# Patient Record
Sex: FEMALE | Race: WHITE | NOT HISPANIC OR LATINO | Employment: FULL TIME | URBAN - METROPOLITAN AREA
[De-identification: names, ages, dates, MRNs, and addresses within clinical notes are randomized per-mention and may not be internally consistent; named-entity substitution may affect disease eponyms.]

---

## 2017-05-17 DIAGNOSIS — F32.A DEPRESSION, UNSPECIFIED DEPRESSION TYPE: Primary | ICD-10-CM

## 2017-05-17 RX ORDER — SERTRALINE HYDROCHLORIDE 50 MG/1
50 TABLET, FILM COATED ORAL DAILY
Qty: 90 TABLET | Refills: 0 | Status: SHIPPED | OUTPATIENT
Start: 2017-05-17 | End: 2018-05-17

## 2017-08-07 ENCOUNTER — OFFICE VISIT (OUTPATIENT)
Dept: OBSTETRICS AND GYNECOLOGY | Facility: CLINIC | Age: 62
End: 2017-08-07
Payer: COMMERCIAL

## 2017-08-07 ENCOUNTER — PATIENT MESSAGE (OUTPATIENT)
Dept: OBSTETRICS AND GYNECOLOGY | Facility: CLINIC | Age: 62
End: 2017-08-07

## 2017-08-07 VITALS
SYSTOLIC BLOOD PRESSURE: 128 MMHG | BODY MASS INDEX: 37.02 KG/M2 | HEIGHT: 67 IN | DIASTOLIC BLOOD PRESSURE: 82 MMHG | WEIGHT: 235.88 LBS

## 2017-08-07 DIAGNOSIS — Z78.0 POST-MENOPAUSE: ICD-10-CM

## 2017-08-07 DIAGNOSIS — Z01.419 ENCOUNTER FOR GYNECOLOGICAL EXAMINATION: ICD-10-CM

## 2017-08-07 DIAGNOSIS — Z12.39 BREAST CANCER SCREENING: Primary | ICD-10-CM

## 2017-08-07 PROCEDURE — 99396 PREV VISIT EST AGE 40-64: CPT | Mod: S$GLB,,, | Performed by: OBSTETRICS & GYNECOLOGY

## 2017-08-07 PROCEDURE — 99999 PR PBB SHADOW E&M-EST. PATIENT-LVL III: CPT | Mod: PBBFAC,,, | Performed by: OBSTETRICS & GYNECOLOGY

## 2017-08-07 RX ORDER — SPIRONOLACTONE 50 MG/1
TABLET, FILM COATED ORAL
COMMUNITY
Start: 2017-07-25 | End: 2019-10-03

## 2017-08-07 RX ORDER — SERTRALINE HYDROCHLORIDE 100 MG/1
TABLET, FILM COATED ORAL
Refills: 3 | COMMUNITY
Start: 2017-06-16

## 2017-08-07 RX ORDER — EZETIMIBE 10 MG/1
10 TABLET ORAL DAILY
Refills: 1 | COMMUNITY
Start: 2017-06-20 | End: 2019-10-03

## 2017-08-07 RX ORDER — ESTRADIOL 1 MG/1
1 TABLET ORAL DAILY
Qty: 90 TABLET | Refills: 3 | Status: SHIPPED | OUTPATIENT
Start: 2017-08-07 | End: 2017-08-21 | Stop reason: SDUPTHER

## 2017-08-07 NOTE — PROGRESS NOTES
CC: Well woman exam    Elsa Juárez is a 62 y.o. female  presents for a well woman exam.  She is established.  LMP: No LMP recorded. Patient has had a hysterectomy..   Annual Exam, since insurance has changed and is not covering Premarin or Detrol.  She will see what is covered for overactive bladder.  The meantime we'll switch to estradiol 0.5 mg patient wants to try to wean off so will take 1 every other day for 1 month and then twice a week for 1 month and then stop.  She will let me know if she has any problems with this.    Last mammo  normal @ [facility],    Last Pap May 2013 negative--vaginal Pap  Last bone density 2014 osteopenia  Last colonoscopy  normal      Health Maintenance   Topic Date Due    Hepatitis C Screening  1955    TETANUS VACCINE  1971    Colonoscopy  2003    Zoster Vaccine  2013    Influenza Vaccine  2017    Mammogram  2017    Lipid Panel  2021         Past Medical History:   Diagnosis Date    Basal cell carcinoma     on forehead    Diverticulosis     Herpes simplex virus (HSV) infection     History of colonoscopy     Normal    Lupus (systemic lupus erythematosus)     Osteopenia        Past Surgical History:   Procedure Laterality Date    BREAST BIOPSY      Left    CHOLECYSTECTOMY      [facility]    HYSTERECTOMY  2002    MONET & BSO    tonsilectomy      TUBAL LIGATION         OB History    Para Term  AB Living   2 2 2     2   SAB TAB Ectopic Multiple Live Births           2      # Outcome Date GA Lbr Lj/2nd Weight Sex Delivery Anes PTL Lv   2 Term            1 Term                   Family History   Problem Relation Age of Onset    Prostate cancer Father     Heart disease Father     Alzheimer's disease Mother     Prostate cancer Brother     Breast cancer Neg Hx     Colon cancer Neg Hx     Diabetes Neg Hx        Social History   Substance Use Topics    Smoking status: Never Smoker     "Smokeless tobacco: Never Used    Alcohol use No       /82   Ht 5' 7" (1.702 m)   Wt 107 kg (235 lb 14.3 oz)   BMI 36.95 kg/m²       ROS:  GENERAL: Denies weight gain or weight loss. Feeling well overall.   SKIN: Denies rash or lesions.   HEAD: Denies head injury or headache.   NODES: Denies enlarged lymph nodes.   CHEST: Denies chest pain or shortness of breath.   CARDIOVASCULAR: Denies palpitations or left sided chest pain.   ABDOMEN: No abdominal pain, constipation, diarrhea, nausea, vomiting or rectal bleeding.   URINARY: No frequency, dysuria, hematuria, or burning on urination.  REPRODUCTIVE: See HPI.   BREASTS: The patient performs breast self-examination and denies pain, lumps, or nipple discharge.   HEMATOLOGIC: No easy bruisability or excessive bleeding.  MUSCULOSKELETAL: Denies joint pain or swelling.   NEUROLOGIC: Denies syncope or weakness.   PSYCHIATRIC: Denies depression, anxiety or mood swings.    Physical Exam:    APPEARANCE: Well nourished, well developed, in no acute distress.  AFFECT: WNL, alert and oriented x 3  SKIN: No acne or hirsutism  ABDOMEN: Soft.  No tenderness or masses.  No hepatosplenomegaly.  No hernias.  BREASTS: Symmetrical, no skin changes or visible lesions.  No palpable masses, nipple discharge bilaterally.  PELVIC: Normal external genitalia without lesions.  Normal hair distribution.  Adequate perineal body, normal urethral meatus.  Vagina moist and well rugated without lesions or discharge.  Cervix pink, without lesions, discharge or tenderness.  No significant cystocele or rectocele.  Bimanual exam shows uterus to be normal size, regular, mobile and nontender.  Adnexa without masses or tenderness.    EXTREMITIES: No edema.    ASSESSMENT AND PLAN  1. Breast cancer screening  Mammo Digital Screening Bilat with Tomosynthesis CAD    Mammo Digital Screening Bilat with Tomosynthesis CAD   2. Encounter for gynecological examination         Patient was counseled today on " A.C.S. Pap guidelines and recommendations for yearly pelvic exams, mammograms and monthly self breast exams; to see her PCP for other health maintenance.     Return in about 1 year (around 8/7/2018).

## 2017-08-21 DIAGNOSIS — Z78.0 POST-MENOPAUSE: ICD-10-CM

## 2017-08-21 RX ORDER — ESTRADIOL 1 MG/1
0.5 TABLET ORAL DAILY
Qty: 45 TABLET | Refills: 3 | Status: SHIPPED | OUTPATIENT
Start: 2017-08-21 | End: 2018-09-17

## 2018-09-17 ENCOUNTER — OFFICE VISIT (OUTPATIENT)
Dept: OBSTETRICS AND GYNECOLOGY | Facility: CLINIC | Age: 63
End: 2018-09-17
Payer: MEDICARE

## 2018-09-17 VITALS
SYSTOLIC BLOOD PRESSURE: 124 MMHG | HEIGHT: 67 IN | WEIGHT: 251.31 LBS | DIASTOLIC BLOOD PRESSURE: 82 MMHG | BODY MASS INDEX: 39.44 KG/M2

## 2018-09-17 DIAGNOSIS — N32.81 OAB (OVERACTIVE BLADDER): Primary | ICD-10-CM

## 2018-09-17 DIAGNOSIS — M81.0 AGE-RELATED OSTEOPOROSIS WITHOUT CURRENT PATHOLOGICAL FRACTURE: ICD-10-CM

## 2018-09-17 DIAGNOSIS — Z12.31 BREAST CANCER SCREENING BY MAMMOGRAM: ICD-10-CM

## 2018-09-17 DIAGNOSIS — M85.80 OSTEOPENIA, UNSPECIFIED LOCATION: ICD-10-CM

## 2018-09-17 DIAGNOSIS — M85.9 DISORDER OF BONE DENSITY AND STRUCTURE, UNSPECIFIED: ICD-10-CM

## 2018-09-17 PROCEDURE — 1101F PT FALLS ASSESS-DOCD LE1/YR: CPT | Mod: CPTII,,, | Performed by: OBSTETRICS & GYNECOLOGY

## 2018-09-17 PROCEDURE — 3008F BODY MASS INDEX DOCD: CPT | Mod: CPTII,,, | Performed by: OBSTETRICS & GYNECOLOGY

## 2018-09-17 PROCEDURE — 99999 PR PBB SHADOW E&M-EST. PATIENT-LVL IV: CPT | Mod: PBBFAC,,, | Performed by: OBSTETRICS & GYNECOLOGY

## 2018-09-17 PROCEDURE — 99213 OFFICE O/P EST LOW 20 MIN: CPT | Mod: S$PBB,,, | Performed by: OBSTETRICS & GYNECOLOGY

## 2018-09-17 PROCEDURE — 99214 OFFICE O/P EST MOD 30 MIN: CPT | Mod: PBBFAC,PN | Performed by: OBSTETRICS & GYNECOLOGY

## 2018-09-17 RX ORDER — OXYBUTYNIN CHLORIDE 5 MG/1
TABLET ORAL
COMMUNITY
Start: 2018-07-07 | End: 2018-09-17 | Stop reason: SDUPTHER

## 2018-09-17 RX ORDER — SERTRALINE HYDROCHLORIDE 50 MG/1
TABLET, FILM COATED ORAL
COMMUNITY
Start: 2018-08-08

## 2018-09-17 RX ORDER — GEMFIBROZIL 600 MG/1
600 TABLET, FILM COATED ORAL
COMMUNITY
Start: 2018-08-27

## 2018-09-17 RX ORDER — OMEPRAZOLE 40 MG/1
CAPSULE, DELAYED RELEASE ORAL
COMMUNITY
Start: 2018-08-14

## 2018-09-17 RX ORDER — OXYBUTYNIN CHLORIDE 5 MG/1
5 TABLET ORAL 2 TIMES DAILY
Qty: 60 TABLET | Refills: 30 | Status: SHIPPED | OUTPATIENT
Start: 2018-09-17 | End: 2019-10-03

## 2018-09-17 RX ORDER — FLUTICASONE PROPIONATE 50 MCG
2 SPRAY, SUSPENSION (ML) NASAL DAILY
Refills: 6 | COMMUNITY
Start: 2018-09-07 | End: 2020-10-12

## 2018-09-17 NOTE — PROGRESS NOTES
[facility] Complaint  OAB  , last mammo  birads 2 at [facility](wants to continue going to [facility]),  Last DEXA  osteopenia, Wants to discuss Oxybutin 5mg, doesnt work as well as the Detrol did but Detrol was too expensive.)      Elsa Juárez is a 63 y.o. female  presents for OAB followup    She is established.  Has OAB on Oxybutinin 5mg daily and not working as well as Detrol. She will find out what is covered with insurance and let us know  Had elevated LFT and is being followed by GI Dr Trent  Cycles:None s/p hysterectomy  Last MM/17 Birads 2  Last Pap May 2013 negative--vaginal Pap  Last bone density 2014 osteopenia  Last colonoscopy  normal    Current Outpatient Medications:     atenolol (TENORMIN) 50 MG tablet, twice a day, Disp: , Rfl:     biotin 5 mg Tab, once a day, Disp: , Rfl:     ezetimibe (ZETIA) 10 mg tablet, Take 10 mg by mouth once daily., Disp: , Rfl: 1    finasteride (PROSCAR) 5 mg tablet, once a day, Disp: , Rfl:     fluticasone (FLONASE) 50 mcg/actuation nasal spray, 2 sprays by Each Nare route once daily., Disp: , Rfl: 6    gemfibrozil (LOPID) 600 MG tablet, , Disp: , Rfl:     HYDROXYCHLOROQUINE SULFATE (PLAQUENIL ORAL), once a day, Disp: , Rfl:     minoxidil 5 % Soln, , Disp: , Rfl:     multivitamin with minerals tablet, , Disp: , Rfl:     omeprazole (PRILOSEC) 40 MG capsule, , Disp: , Rfl:     oxybutynin (DITROPAN) 5 MG Tab, Take 1 tablet (5 mg total) by mouth 2 (two) times daily., Disp: 60 tablet, Rfl: 30    sertraline (ZOLOFT) 100 MG tablet, TAKE 1 TABLET (100 MG TOTAL) BY MOUTH ONCE DAILY., Disp: , Rfl: 3    sertraline (ZOLOFT) 50 MG tablet, , Disp: , Rfl:     spironolactone (ALDACTONE) 50 MG tablet, , Disp: , Rfl:     Past Medical History:   Diagnosis Date    Basal cell carcinoma     on forehead    Diverticulosis     Elevated liver enzymes 2018    Herpes simplex virus (HSV) infection     History of colonoscopy 2014    Normal    Lupus (systemic lupus  "erythematosus)     Osteopenia        Past Surgical History:   Procedure Laterality Date    BREAST BIOPSY  2008    Left    CHOLECYSTECTOMY  2015    [facility]    HYSTERECTOMY  2002    MONET & BSO    TOE SURGERY  2018    right foot, second toe    tonsilectomy      TUBAL LIGATION         OB History    Para Term  AB Living   2 2 2     2   SAB TAB Ectopic Multiple Live Births           2      # Outcome Date GA Lbr Lj/2nd Weight Sex Delivery Anes PTL Lv   2 Term            1 Term                   Family History   Problem Relation Age of Onset    Prostate cancer Father     Heart disease Father     Alzheimer's disease Mother     Prostate cancer Brother     Breast cancer Neg Hx     Colon cancer Neg Hx     Diabetes Neg Hx        Social History     Tobacco Use    Smoking status: Never Smoker    Smokeless tobacco: Never Used   Substance Use Topics    Alcohol use: No    Drug use: No         ROS:    GENERAL: Denies weight gain or weight loss. Feeling well overall.   SKIN: Denies rash or lesions.   HEENT: Denies headaches, or vision changes.   CARDIOVASCULAR: Denies palpitations or left sided chest pain.   RESPIRATORY: Denies shortness of breath or dyspnea on exertion.  BREASTS: Denies pain, lumps, or nipple discharge.   ABDOMEN: Denies abdominal pain, constipation, diarrhea, nausea, vomiting, change in appetite or rectal bleeding.   URINARY: Denies frequency, dysuria, hematuria.  NEUROLOGIC: Denies syncope or weakness.   PSYCHIATRIC: Denies depression, anxiety or mood swings.    Physical Exam:  /82   Ht 5' 7" (1.702 m)   Wt 114 kg (251 lb 5.2 oz)   BMI 39.36 kg/m²     APPEARANCE: Well nourished, well developed, in no acute distress.  AFFECT: WNL, alert and oriented x 3  SKIN: No acne or hirsutism  BREASTS: Symmetrical, no skin changes.                    No nipple discharge. No palpable masses bilaterally  ABDOMEN: soft Non tender Non distended No masses  PELVIC:   Normal external " genitalia without lesions.   Normal urethral meatus. No signif cystocele or rectocele.  Vagina atrophic without lesions or discharge.  Cuff intact  Cervix absent  Adnexa without masses or tenderness.    EXTREMITIES: No edema.    ASSESSMENT AND PLAN  1. OAB (overactive bladder)  oxybutynin (DITROPAN) 5 MG Tab   2. Osteopenia, unspecified location  DXA Bone Density Spine And Hip    DXA Bone Density Spine And Hip   3. Breast cancer screening by mammogram  Mammo Digital Screening Bilat with Tomosynthesis CAD    Mammo Digital Screening Bilat with Tomosynthesis CAD   4. Disorder of bone density and structure, unspecified   DXA Bone Density Spine And Hip    DXA Bone Density Spine And Hip   5. Age-related osteoporosis without current pathological fracture   DXA Bone Density Spine And Hip    DXA Bone Density Spine And Hip       Elsa was seen today for well woman.    Diagnoses and all orders for this visit:    OAB (overactive bladder)  -     oxybutynin (DITROPAN) 5 MG Tab; Take 1 tablet (5 mg total) by mouth 2 (two) times daily.  Will increase Ditropan to twice daily and she will find out from [employer] what is covered so that we can change prescription.    Osteopenia, unspecified location  -     DXA Bone Density Spine And Hip; Future  -     DXA Bone Density Spine And Hip   Recommend vitamin-D.  Breast cancer screening by mammogram  -     Mammo Digital Screening Bilat with Tomosynthesis CAD; Future  -     Mammo Digital Screening Bilat with Tomosynthesis CAD   [facility]  Disorder of bone density and structure, unspecified   -     DXA Bone Density Spine And Hip; Future  -     DXA Bone Density Spine And Hip   Patient to [facility] at [facility]  Age-related osteoporosis without current pathological fracture   -     DXA Bone Density Spine And Hip; Future  -     DXA Bone Density Spine And Hip        Annual well woman exam- pap not applicable s/p Hyst    Patient was counseled today on  A.C.S. Pap guidelines and recommendations for yearly pelvic exams, mammograms and monthly self breast exams; to see her PCP for other health maintenance.     Patient encouraged to register for portal and results will be sent via portal.    Follow-up in about 1 year (around 9/17/2019) for Well-woman exam.         Health Maintenance   Topic Date Due    Hepatitis C Screening  1955    TETANUS VACCINE  01/21/1971    DEXA SCAN  01/21/1993    Colonoscopy  01/21/2003    Zoster Vaccine  01/21/2013    Mammogram  08/22/2017    Pneumococcal (65+) (1 of 2 - PCV13) 01/21/2018    Influenza Vaccine  08/01/2018    Lipid Panel  02/23/2021

## 2018-09-26 NOTE — TELEPHONE ENCOUNTER
Pt. Called her insurance & asking if you think it would make a difference if she takes two 5mg Oxybutin (Ditropan) at once instead of twice daily?  Nothing else that insurance covers is affordable.

## 2018-09-27 RX ORDER — OXYBUTYNIN CHLORIDE 15 MG/1
15 TABLET, EXTENDED RELEASE ORAL DAILY
Qty: 90 TABLET | Refills: 3 | Status: SHIPPED | OUTPATIENT
Start: 2018-09-27 | End: 2019-09-27

## 2018-09-27 NOTE — TELEPHONE ENCOUNTER
Pt. States that Oxybutin ER 15mg one po qd is affordable for her.  states she can try & see if it is any different. Will send rx to [facility]

## 2018-10-01 RX ORDER — OXYBUTYNIN CHLORIDE 15 MG/1
15 TABLET, EXTENDED RELEASE ORAL DAILY
Qty: 90 TABLET | Refills: 3 | Status: SHIPPED | OUTPATIENT
Start: 2018-10-01 | End: 2019-10-01

## 2018-10-01 RX ORDER — OXYBUTYNIN CHLORIDE 15 MG/1
15 TABLET, EXTENDED RELEASE ORAL DAILY
Qty: 90 TABLET | Refills: 3 | Status: CANCELLED | OUTPATIENT
Start: 2018-10-01 | End: 2019-10-01

## 2018-10-10 ENCOUNTER — TELEPHONE (OUTPATIENT)
Dept: OBSTETRICS AND GYNECOLOGY | Facility: CLINIC | Age: 63
End: 2018-10-10

## 2018-10-10 NOTE — TELEPHONE ENCOUNTER
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

## 2019-08-26 RX ORDER — OXYBUTYNIN CHLORIDE 15 MG/1
TABLET, EXTENDED RELEASE ORAL
Qty: 90 TABLET | Refills: 3 | Status: SHIPPED | OUTPATIENT
Start: 2019-08-26 | End: 2022-03-21

## 2019-10-03 ENCOUNTER — OFFICE VISIT (OUTPATIENT)
Dept: OBSTETRICS AND GYNECOLOGY | Facility: CLINIC | Age: 64
End: 2019-10-03
Payer: MEDICARE

## 2019-10-03 VITALS
BODY MASS INDEX: 38.06 KG/M2 | DIASTOLIC BLOOD PRESSURE: 80 MMHG | HEIGHT: 67 IN | WEIGHT: 242.5 LBS | SYSTOLIC BLOOD PRESSURE: 126 MMHG

## 2019-10-03 DIAGNOSIS — Z12.31 BREAST CANCER SCREENING BY MAMMOGRAM: ICD-10-CM

## 2019-10-03 DIAGNOSIS — Z01.419 ENCOUNTER FOR GYNECOLOGICAL EXAMINATION: Primary | ICD-10-CM

## 2019-10-03 PROCEDURE — G0101 CA SCREEN;PELVIC/BREAST EXAM: HCPCS | Mod: S$GLB,,, | Performed by: OBSTETRICS & GYNECOLOGY

## 2019-10-03 PROCEDURE — G0101 PR CA SCREEN;PELVIC/BREAST EXAM: ICD-10-PCS | Mod: S$GLB,,, | Performed by: OBSTETRICS & GYNECOLOGY

## 2019-10-03 PROCEDURE — 99999 PR PBB SHADOW E&M-EST. PATIENT-LVL III: CPT | Mod: PBBFAC,,, | Performed by: OBSTETRICS & GYNECOLOGY

## 2019-10-03 PROCEDURE — 99999 PR PBB SHADOW E&M-EST. PATIENT-LVL III: ICD-10-PCS | Mod: PBBFAC,,, | Performed by: OBSTETRICS & GYNECOLOGY

## 2019-10-03 RX ORDER — DUTASTERIDE 0.5 MG/1
CAPSULE, LIQUID FILLED ORAL
COMMUNITY
Start: 2019-08-26 | End: 2023-01-25

## 2019-10-03 RX ORDER — HYDROXYCHLOROQUINE SULFATE 200 MG/1
TABLET, FILM COATED ORAL
COMMUNITY
Start: 2019-09-14

## 2019-10-03 NOTE — PROGRESS NOTES
"[facility] Complaint Well woman exam:  Well Woman (Annual Exam, Last mammo  birads 2 [facility], Last colonoscopy  normal, Last DEXA  normal [facility])      Elsa Juárez is a 64 y.o. female  presents for a well woman exam.    She is established.  No complaints. Had left breast pain a week or so ago and lasted for 3 d. Did not feel any masses  Her  had recent knee replacement surgery and she has been helping with 10 and take care of him this past month. We discussed could be musculoskelatal  Specifically, patient denies abnormal vaginal bleeding, discharge and odor", or "pelvic pain.     LMP: none  S/p MONET and BSO  Last pap: s2018 [facility] birads 2   Last colonoscopy:  Normal  Last bone density 2018 diagnostic imaging normal      Current Outpatient Medications:     atenolol (TENORMIN) 50 MG tablet, twice a day, Disp: , Rfl:     biotin 5 mg Tab, once a day, Disp: , Rfl:     dutasteride (AVODART) 0.5 mg capsule, , Disp: , Rfl:     fluticasone (FLONASE) 50 mcg/actuation nasal spray, 2 sprays by Each Nare route once daily., Disp: , Rfl: 6    gemfibrozil (LOPID) 600 MG tablet, , Disp: , Rfl:     hydroxychloroquine (PLAQUENIL) 200 mg tablet, , Disp: , Rfl:     minoxidil 5 % Soln, , Disp: , Rfl:     multivitamin with minerals tablet, , Disp: , Rfl:     omeprazole (PRILOSEC) 40 MG capsule, , Disp: , Rfl:     oxybutynin (DITROPAN XL) 15 MG TR24, TAKE 1 TABLET EVERY DAY, Disp: 90 tablet, Rfl: 3    sertraline (ZOLOFT) 100 MG tablet, TAKE 1 TABLET (100 MG TOTAL) BY MOUTH ONCE DAILY., Disp: , Rfl: 3    sertraline (ZOLOFT) 50 MG tablet, , Disp: , Rfl:     Past Medical History:   Diagnosis Date    Basal cell carcinoma     on forehead    Diverticulosis     Elevated liver enzymes 2018    Herpes simplex virus (HSV) infection     History of colonoscopy     Normal    Lupus (systemic lupus erythematosus)     Osteopenia        Past Surgical History:   Procedure Laterality Date    BREAST BIOPSY  " "2008    Left    CHOLECYSTECTOMY  2015    [facility]    HYSTERECTOMY  2002    MONET & BSO    TOE SURGERY  2018    right foot, second toe    tonsilectomy      TUBAL LIGATION         OB History    Para Term  AB Living   2 2 2     2   SAB TAB Ectopic Multiple Live Births           2      # Outcome Date GA Lbr Lj/2nd Weight Sex Delivery Anes PTL Lv   2 Term            1 Term                Family History   Problem Relation Age of Onset    Prostate cancer Father     Heart disease Father     Alzheimer's disease Mother     Prostate cancer Brother     Breast cancer Neg Hx     Colon cancer Neg Hx     Diabetes Neg Hx        Social History     Tobacco Use    Smoking status: Never Smoker    Smokeless tobacco: Never Used   Substance Use Topics    Alcohol use: No    Drug use: No         ROS:    GENERAL: Denies weight gain or weight loss. Feeling well overall.   SKIN: Denies rash or lesions.   HEENT: Denies headaches, or vision changes.   CARDIOVASCULAR: Denies palpitations or left sided chest pain.   RESPIRATORY: Denies shortness of breath or dyspnea on exertion.  BREASTS: Denies pain, lumps, or nipple discharge.   ABDOMEN: Denies abdominal pain, constipation, diarrhea, nausea, vomiting, change in appetite or rectal bleeding.   URINARY: Denies frequency, dysuria, hematuria.  NEUROLOGIC: Denies syncope or weakness.   PSYCHIATRIC: Denies depression, anxiety or mood swings.    Physical Exam:  /80   Ht 5' 7" (1.702 m)   Wt 110 kg (242 lb 8.1 oz)   BMI 37.98 kg/m²     APPEARANCE: Well nourished, well developed, in no acute distress.  AFFECT: WNL, alert and oriented x 3  SKIN: No acne or hirsutism  BREASTS: Symmetrical, no skin changes.                     No nipple discharge. No palpable masses bilaterally  NODES: No inguinal nor axillary LAD  ABDOMEN: soft Non tender Non distended No masses  PELVIC:   Normal external genitalia without lesions.   Normal urethral meatus. No signif cystocele or " rectocele.  Vagina atrophic without lesions or discharge.  Cuff intact  Cervix absent  Adnexa without masses or tenderness.    EXTREMITIES: No edema.    ASSESSMENT AND PLAN  Elsa was seen today for well woman.    Diagnoses and all orders for this visit:    Encounter for gynecological examination  Had left breast pain a week or so ago and lasted for 3 d. Did not feel any masses  Her  had recent knee replacement surgery and she has been helping with 10 and take care of him this past month. We discussed could be musculoskelatal  No pain now-- will observe and if returns she will let me know    Breast cancer screening by mammogram  -     Mammo Digital Screening Bilat w/ Ajime; Future  -     Mammo Digital Screening Bilat w/ Jaime    Rec Vit D 2000 IU daily    Patient was counseled today on A.C.S. Pap guidelines and recommendations for yearly pelvic exams, mammograms and monthly self breast exams; to see her PCP for other health maintenance.     Patient encouraged to register for portal and results will be sent via portal.    Follow up in about 1 year (around 10/3/2020).         Health Maintenance   Topic Date Due    Hepatitis C Screening  1955    TETANUS VACCINE  01/21/1971    DEXA SCAN  01/21/1993    Colonoscopy  01/21/2003    Mammogram  08/22/2017    Pneumococcal Vaccine (65+ Low/Medium Risk) (1 of 2 - PCV13) 01/21/2018    Lipid Panel  02/23/2021

## 2020-10-12 ENCOUNTER — OFFICE VISIT (OUTPATIENT)
Dept: OBSTETRICS AND GYNECOLOGY | Facility: CLINIC | Age: 65
End: 2020-10-12
Attending: OBSTETRICS & GYNECOLOGY
Payer: MEDICARE

## 2020-10-12 VITALS
WEIGHT: 244.69 LBS | SYSTOLIC BLOOD PRESSURE: 132 MMHG | DIASTOLIC BLOOD PRESSURE: 84 MMHG | BODY MASS INDEX: 38.4 KG/M2 | TEMPERATURE: 97 F | HEIGHT: 67 IN

## 2020-10-12 DIAGNOSIS — Z01.419 ENCOUNTER FOR GYNECOLOGICAL EXAMINATION: Primary | ICD-10-CM

## 2020-10-12 DIAGNOSIS — Z12.31 BREAST CANCER SCREENING BY MAMMOGRAM: ICD-10-CM

## 2020-10-12 PROCEDURE — 99999 PR PBB SHADOW E&M-EST. PATIENT-LVL IV: ICD-10-PCS | Mod: PBBFAC,,, | Performed by: OBSTETRICS & GYNECOLOGY

## 2020-10-12 PROCEDURE — G0101 PR CA SCREEN;PELVIC/BREAST EXAM: ICD-10-PCS | Mod: S$GLB,,, | Performed by: OBSTETRICS & GYNECOLOGY

## 2020-10-12 PROCEDURE — 99999 PR PBB SHADOW E&M-EST. PATIENT-LVL IV: CPT | Mod: PBBFAC,,, | Performed by: OBSTETRICS & GYNECOLOGY

## 2020-10-12 PROCEDURE — G0101 CA SCREEN;PELVIC/BREAST EXAM: HCPCS | Mod: S$GLB,,, | Performed by: OBSTETRICS & GYNECOLOGY

## 2020-10-12 NOTE — PROGRESS NOTES
LMP: No LMP recorded. Patient has had a hysterectomy.  Meds per MD: None    Last Pap: N/A  Last MMG: 10- birads 2 [facility]  Last Colonoscopy:  2014 Normal  Last Bone Density: 2018 normal [facility]

## 2020-10-12 NOTE — PROGRESS NOTES
[facility] Complaint Well woman exam:  Well Woman (Annual Exam)    She is established.     Elsa Juárez is a 65 y.o. female  presents for a well woman exam.    S/p MONET and BSO     ROS:  No abdominal pain No vaginal bleeding or discharge  No breast pain or masses, No rectal bleeding      LMP: No LMP recorded. Patient has had a hysterectomy.  Meds per MD: None   Last Pap: N/A  Last MMG: 10- birads 2 [facility]  Last Colonoscopy:   Normal  Last Bone Density:  normal [facility]       Past Medical History:   Diagnosis Date    Basal cell carcinoma     on forehead    Diverticulosis     Elevated liver enzymes     Herpes simplex virus (HSV) infection     History of colonoscopy     Normal    Lupus (systemic lupus erythematosus)     Osteopenia 2014       Past Surgical History:   Procedure Laterality Date    BREAST BIOPSY      Left    CHOLECYSTECTOMY      [facility]    HYSTERECTOMY  2002    MONET & BSO    TOE SURGERY  2018    right foot, second toe    tonsilectomy      TUBAL LIGATION         OB History    Para Term  AB Living   2 2 2     2   SAB TAB Ectopic Multiple Live Births           2      # Outcome Date GA Lbr Lj/2nd Weight Sex Delivery Anes PTL Lv   2 Term            1 Term                Family History   Problem Relation Age of Onset    Prostate cancer Father     Heart disease Father     Alzheimer's disease Mother     Prostate cancer Brother     Breast cancer Neg Hx     Colon cancer Neg Hx     Diabetes Neg Hx        Social History     Tobacco Use    Smoking status: Never Smoker    Smokeless tobacco: Never Used   Substance Use Topics    Alcohol use: No    Drug use: No         ROS:    GENERAL: Denies weight gain or weight loss. Feeling well overall.   SKIN: Denies rash or lesions.   HEENT: Denies headaches, or vision changes.   CARDIOVASCULAR: Denies palpitations or left sided chest pain.   RESPIRATORY: Denies shortness of breath or dyspnea on exertion.  BREASTS:  "Denies pain, lumps, or nipple discharge.   ABDOMEN: Denies abdominal pain, constipation, diarrhea, nausea, vomiting, change in appetite or rectal bleeding.   URINARY: Denies frequency, dysuria, hematuria.  NEUROLOGIC: Denies syncope or weakness.   PSYCHIATRIC: Denies depression, anxiety or mood swings.    Physical Exam:  /84   Temp 97.3 °F (36.3 °C)   Ht 5' 7" (1.702 m)   Wt 111 kg (244 lb 11.4 oz)   BMI 38.33 kg/m²     APPEARANCE: Well nourished, well developed, in no acute distress.  AFFECT: WNL, alert and oriented x 3  SKIN: No acne or hirsutism  BREASTS: Symmetrical, no skin changes.                      No nipple discharge.   No palpable masses bilaterally  NODES: No inguinal nor axillary LAD  ABDOMEN: soft Non tender Non distended No masses  PELVIC:   Normal external genitalia without lesions.   Normal urethral meatus.   No signif cystocele or rectocele.  Vagina atrophic without lesions or discharge.  Cuff intact  Cervix absent  Adnexa without masses or tenderness.    EXTREMITIES: No edema.    ASSESSMENT AND PLAN  Elsa was seen today for well woman.    Diagnoses and all orders for this visit:    Encounter for gynecological examination    Breast cancer screening by mammogram  -     Mammo Digital Screening Bilat w/ Jaime; Future  -     Mammo Digital Screening Bilat w/ Jaime          Patient was counseled today on A.C.S. Pap guidelines and recommendations for yearly pelvic exams, mammograms and monthly self breast exams; to see her PCP for other health maintenance.     Patient encouraged to register for portal and results will be sent via portal.    No follow-ups on file.         Health Maintenance   Topic Date Due    Hepatitis C Screening  1955    TETANUS VACCINE  01/21/1971    DEXA SCAN  01/21/1993    Pneumococcal Vaccine (65+ Low/Medium Risk) (1 of 2 - PCV13) 01/21/2018    Mammogram  10/22/2020    Lipid Panel  02/23/2021                   "

## 2022-01-07 ENCOUNTER — OFFICE VISIT (OUTPATIENT)
Dept: OBSTETRICS AND GYNECOLOGY | Facility: CLINIC | Age: 67
End: 2022-01-07
Attending: OBSTETRICS & GYNECOLOGY
Payer: MEDICARE

## 2022-01-07 VITALS
DIASTOLIC BLOOD PRESSURE: 84 MMHG | BODY MASS INDEX: 37.37 KG/M2 | WEIGHT: 238.13 LBS | SYSTOLIC BLOOD PRESSURE: 138 MMHG | HEIGHT: 67 IN

## 2022-01-07 DIAGNOSIS — N32.81 OAB (OVERACTIVE BLADDER): ICD-10-CM

## 2022-01-07 DIAGNOSIS — Z13.820 SCREENING FOR OSTEOPOROSIS: ICD-10-CM

## 2022-01-07 DIAGNOSIS — Z01.419 ENCOUNTER FOR GYNECOLOGICAL EXAMINATION: Primary | ICD-10-CM

## 2022-01-07 DIAGNOSIS — Z12.31 BREAST CANCER SCREENING BY MAMMOGRAM: ICD-10-CM

## 2022-01-07 DIAGNOSIS — M81.0 AGE-RELATED OSTEOPOROSIS WITHOUT CURRENT PATHOLOGICAL FRACTURE: ICD-10-CM

## 2022-01-07 PROCEDURE — 99999 PR PBB SHADOW E&M-EST. PATIENT-LVL III: CPT | Mod: PBBFAC,,, | Performed by: OBSTETRICS & GYNECOLOGY

## 2022-01-07 PROCEDURE — 3008F PR BODY MASS INDEX (BMI) DOCUMENTED: ICD-10-PCS | Mod: CPTII,S$GLB,, | Performed by: OBSTETRICS & GYNECOLOGY

## 2022-01-07 PROCEDURE — 3288F PR FALLS RISK ASSESSMENT DOCUMENTED: ICD-10-PCS | Mod: CPTII,S$GLB,, | Performed by: OBSTETRICS & GYNECOLOGY

## 2022-01-07 PROCEDURE — 3288F FALL RISK ASSESSMENT DOCD: CPT | Mod: CPTII,S$GLB,, | Performed by: OBSTETRICS & GYNECOLOGY

## 2022-01-07 PROCEDURE — 1101F PR PT FALLS ASSESS DOC 0-1 FALLS W/OUT INJ PAST YR: ICD-10-PCS | Mod: CPTII,S$GLB,, | Performed by: OBSTETRICS & GYNECOLOGY

## 2022-01-07 PROCEDURE — 1159F MED LIST DOCD IN RCRD: CPT | Mod: CPTII,S$GLB,, | Performed by: OBSTETRICS & GYNECOLOGY

## 2022-01-07 PROCEDURE — 1101F PT FALLS ASSESS-DOCD LE1/YR: CPT | Mod: CPTII,S$GLB,, | Performed by: OBSTETRICS & GYNECOLOGY

## 2022-01-07 PROCEDURE — G0101 CA SCREEN;PELVIC/BREAST EXAM: HCPCS | Mod: S$GLB,,, | Performed by: OBSTETRICS & GYNECOLOGY

## 2022-01-07 PROCEDURE — 3079F PR MOST RECENT DIASTOLIC BLOOD PRESSURE 80-89 MM HG: ICD-10-PCS | Mod: CPTII,S$GLB,, | Performed by: OBSTETRICS & GYNECOLOGY

## 2022-01-07 PROCEDURE — 1159F PR MEDICATION LIST DOCUMENTED IN MEDICAL RECORD: ICD-10-PCS | Mod: CPTII,S$GLB,, | Performed by: OBSTETRICS & GYNECOLOGY

## 2022-01-07 PROCEDURE — G0101 PR CA SCREEN;PELVIC/BREAST EXAM: ICD-10-PCS | Mod: S$GLB,,, | Performed by: OBSTETRICS & GYNECOLOGY

## 2022-01-07 PROCEDURE — 3079F DIAST BP 80-89 MM HG: CPT | Mod: CPTII,S$GLB,, | Performed by: OBSTETRICS & GYNECOLOGY

## 2022-01-07 PROCEDURE — 3075F SYST BP GE 130 - 139MM HG: CPT | Mod: CPTII,S$GLB,, | Performed by: OBSTETRICS & GYNECOLOGY

## 2022-01-07 PROCEDURE — 1126F PR PAIN SEVERITY QUANTIFIED, NO PAIN PRESENT: ICD-10-PCS | Mod: CPTII,S$GLB,, | Performed by: OBSTETRICS & GYNECOLOGY

## 2022-01-07 PROCEDURE — 3008F BODY MASS INDEX DOCD: CPT | Mod: CPTII,S$GLB,, | Performed by: OBSTETRICS & GYNECOLOGY

## 2022-01-07 PROCEDURE — 99999 PR PBB SHADOW E&M-EST. PATIENT-LVL III: ICD-10-PCS | Mod: PBBFAC,,, | Performed by: OBSTETRICS & GYNECOLOGY

## 2022-01-07 PROCEDURE — 3075F PR MOST RECENT SYSTOLIC BLOOD PRESS GE 130-139MM HG: ICD-10-PCS | Mod: CPTII,S$GLB,, | Performed by: OBSTETRICS & GYNECOLOGY

## 2022-01-07 PROCEDURE — 1126F AMNT PAIN NOTED NONE PRSNT: CPT | Mod: CPTII,S$GLB,, | Performed by: OBSTETRICS & GYNECOLOGY

## 2022-01-07 RX ORDER — MIRABEGRON 25 MG/1
25 TABLET, FILM COATED, EXTENDED RELEASE ORAL DAILY
Qty: 30 TABLET | Refills: 11 | Status: SHIPPED | OUTPATIENT
Start: 2022-01-07 | End: 2022-03-21

## 2022-01-07 NOTE — PROGRESS NOTES
LMP: No LMP recorded. Patient has had a hysterectomy.  Meds per MD: OAB meds, Zoloft     Last Pap: N/A  Last MM2020 birads 2 [facility]  Last Colonoscopy: due   Last Bone Density:  normal [facility]

## 2022-01-07 NOTE — PROGRESS NOTES
Chief Complaint Well woman exam:  Annual Exam    She is established.     Elsa Juárez is a 66 y.o. female  presents for a well woman exam.    S/p MONET and BSO  On Oxybutyrin but not working as well and wants to change meds and try myrbetric    ROS:  No abdominal pain No vaginal bleeding or discharge  No breast pain or masses, No rectal bleeding      LMP: No LMP recorded. Patient has had a hysterectomy.  Meds per MD: OAB meds, Zoloft      Last Pap: N/A  Last MM2020 birads 2 DIS  Last Colonoscopy: due   Last Bone Density:  normal DIS    Past Medical History:   Diagnosis Date    Basal cell carcinoma     on forehead    Diverticulosis     Elevated liver enzymes     Herpes simplex virus (HSV) infection     History of colonoscopy     Normal    Lupus (systemic lupus erythematosus)     Osteopenia        Past Surgical History:   Procedure Laterality Date    BREAST BIOPSY      Left    CHOLECYSTECTOMY  2015    [facility]    HYSTERECTOMY  2002    MONET & BSO    TOE SURGERY  2018    right foot, second toe    tonsilectomy      TUBAL LIGATION         OB History    Para Term  AB Living   2 2 2     2   SAB IAB Ectopic Multiple Live Births           2      # Outcome Date GA Lbr Lj/2nd Weight Sex Delivery Anes PTL Lv   2 Term            1 Term                Family History   Problem Relation Age of Onset    Prostate cancer Father     Heart disease Father     Alzheimer's disease Mother     Prostate cancer Brother     Breast cancer Neg Hx     Colon cancer Neg Hx     Diabetes Neg Hx        Social History     Tobacco Use    Smoking status: Never Smoker    Smokeless tobacco: Never Used   Substance Use Topics    Alcohol use: No    Drug use: No         ROS:    GENERAL: Denies weight gain or weight loss. Feeling well overall.   SKIN: Denies rash or lesions.   HEENT: Denies headaches, or vision changes.   CARDIOVASCULAR: Denies palpitations or chest pain.   RESPIRATORY:  "Denies shortness of breath   BREASTS: Denies pain, lumps, or nipple discharge.   ABDOMEN: Denies abdominal pain, constipation, diarrhea, nausea, vomiting, change in appetite or rectal bleeding.   URINARY: Denies frequency, dysuria, hematuria.    Physical Exam:  /84   Ht 5' 7" (1.702 m)   Wt 108 kg (238 lb 1.6 oz)   BMI 37.29 kg/m²     APPEARANCE: Well nourished, well developed, in no acute distress.  AFFECT: WNL, alert and oriented x 3  SKIN: No acne or hirsutism  BREASTS: Symmetrical, no skin changes.                      No nipple discharge.   No palpable masses bilaterally  NODES: No inguinal nor axillary LAD  ABDOMEN: soft Non tender Non distended No masses  PELVIC:   Normal external genitalia without lesions.   Normal urethral meatus.   No signif cystocele or rectocele.  Vagina atrophic without lesions or discharge.  Cuff intact  Cervix absent  Adnexa without masses or tenderness.    EXTREMITIES: No edema.    ASSESSMENT AND PLAN  Elsa was seen today for annual exam.    Diagnoses and all orders for this visit:    Encounter for gynecological examination    Screening for osteoporosis  -     DXA Bone Density Spine And Hip; Future  -     DXA Bone Density Spine And Hip    Breast cancer screening by mammogram  -     Mammo Digital Screening Bilat w/ Ja[facility]; Future  -     Mammo Digital Screening Bilat w/ [facility]    Age-related osteoporosis without current pathological fracture   -     DXA Bone Density Spine And Hip; Future  -     DXA Bone Density Spine And Hip    OAB (overactive bladder)  -     mirabegron (MYRBETRIQ) 25 mg Tb24 ER tablet; Take 1 tablet (25 mg total) by mouth once daily.        Patient was counseled today on A.C.S. Pap guidelines and recommendations for yearly pelvic exams, mammograms and monthly self breast exams; to see her PCP for other health maintenance.     Patient encouraged to register for portal and results will be sent via portal.    No follow-ups on file.         Health Maintenance " Epidural   Topic Date Due    Hepatitis C Screening  Never done    TETANUS VACCINE  Never done    DEXA SCAN  Never done    Mammogram  10/22/2020    Lipid Panel  02/23/2021

## 2022-01-26 NOTE — PROGRESS NOTES
Your bone density results are in!  Good news!  You do not have osteoporosis, but you do have osteopenia which is the beginning process of decreased bone mass.  The good news is that you don't need any medication to treat this.    I do recommend vitamin D 4468-8799 IUs daily and exercising 3-4 times weekly.  Any exercise that builds muscle does help to build bone.  Take care and let me know if you have any other questions,  Dr. Baengas

## 2022-01-27 ENCOUNTER — PATIENT MESSAGE (OUTPATIENT)
Dept: OBSTETRICS AND GYNECOLOGY | Facility: CLINIC | Age: 67
End: 2022-01-27
Payer: MEDICARE

## 2022-01-27 NOTE — PROGRESS NOTES
Just wanted to let you know that I got your mammogram results back and the radiologist reading is perfectly normal. Let me know if you have any questions or concerns  Hope you have a great day!  Dr Banegas

## 2022-02-10 ENCOUNTER — PATIENT MESSAGE (OUTPATIENT)
Dept: OBSTETRICS AND GYNECOLOGY | Facility: CLINIC | Age: 67
End: 2022-02-10
Payer: MEDICARE

## 2022-03-21 ENCOUNTER — PATIENT MESSAGE (OUTPATIENT)
Dept: OBSTETRICS AND GYNECOLOGY | Facility: CLINIC | Age: 67
End: 2022-03-21
Payer: MEDICARE

## 2022-03-21 RX ORDER — MIRABEGRON 50 MG/1
50 TABLET, FILM COATED, EXTENDED RELEASE ORAL DAILY
Qty: 90 TABLET | Refills: 3 | Status: SHIPPED | OUTPATIENT
Start: 2022-03-21 | End: 2023-01-25

## 2022-03-22 ENCOUNTER — PATIENT MESSAGE (OUTPATIENT)
Dept: OBSTETRICS AND GYNECOLOGY | Facility: CLINIC | Age: 67
End: 2022-03-22
Payer: MEDICARE

## 2022-08-29 ENCOUNTER — PATIENT MESSAGE (OUTPATIENT)
Dept: OBSTETRICS AND GYNECOLOGY | Facility: CLINIC | Age: 67
End: 2022-08-29
Payer: MEDICARE

## 2022-08-29 DIAGNOSIS — N32.81 OAB (OVERACTIVE BLADDER): Primary | ICD-10-CM

## 2022-08-29 RX ORDER — OXYBUTYNIN CHLORIDE 10 MG/1
10 TABLET, EXTENDED RELEASE ORAL DAILY
Qty: 90 TABLET | Refills: 3 | Status: SHIPPED | OUTPATIENT
Start: 2022-08-29 | End: 2023-06-17 | Stop reason: SDUPTHER

## 2023-01-25 ENCOUNTER — OFFICE VISIT (OUTPATIENT)
Dept: OBSTETRICS AND GYNECOLOGY | Facility: CLINIC | Age: 68
End: 2023-01-25
Attending: OBSTETRICS & GYNECOLOGY
Payer: MEDICARE

## 2023-01-25 VITALS — HEIGHT: 67 IN | WEIGHT: 240.31 LBS | BODY MASS INDEX: 37.72 KG/M2

## 2023-01-25 DIAGNOSIS — Z01.419 ENCOUNTER FOR GYNECOLOGICAL EXAMINATION: Primary | ICD-10-CM

## 2023-01-25 DIAGNOSIS — Z12.31 SCREENING MAMMOGRAM FOR BREAST CANCER: ICD-10-CM

## 2023-01-25 PROCEDURE — 1126F PR PAIN SEVERITY QUANTIFIED, NO PAIN PRESENT: ICD-10-PCS | Mod: CPTII,S$GLB,, | Performed by: OBSTETRICS & GYNECOLOGY

## 2023-01-25 PROCEDURE — G0101 CA SCREEN;PELVIC/BREAST EXAM: HCPCS | Mod: S$GLB,,, | Performed by: OBSTETRICS & GYNECOLOGY

## 2023-01-25 PROCEDURE — 99999 PR PBB SHADOW E&M-EST. PATIENT-LVL III: CPT | Mod: PBBFAC,,, | Performed by: OBSTETRICS & GYNECOLOGY

## 2023-01-25 PROCEDURE — 99999 PR PBB SHADOW E&M-EST. PATIENT-LVL III: ICD-10-PCS | Mod: PBBFAC,,, | Performed by: OBSTETRICS & GYNECOLOGY

## 2023-01-25 PROCEDURE — 3288F FALL RISK ASSESSMENT DOCD: CPT | Mod: CPTII,S$GLB,, | Performed by: OBSTETRICS & GYNECOLOGY

## 2023-01-25 PROCEDURE — 1159F MED LIST DOCD IN RCRD: CPT | Mod: CPTII,S$GLB,, | Performed by: OBSTETRICS & GYNECOLOGY

## 2023-01-25 PROCEDURE — 1100F PR PT FALLS ASSESS DOC 2+ FALLS/FALL W/INJURY/YR: ICD-10-PCS | Mod: CPTII,S$GLB,, | Performed by: OBSTETRICS & GYNECOLOGY

## 2023-01-25 PROCEDURE — 3008F BODY MASS INDEX DOCD: CPT | Mod: CPTII,S$GLB,, | Performed by: OBSTETRICS & GYNECOLOGY

## 2023-01-25 PROCEDURE — G0101 PR CA SCREEN;PELVIC/BREAST EXAM: ICD-10-PCS | Mod: S$GLB,,, | Performed by: OBSTETRICS & GYNECOLOGY

## 2023-01-25 PROCEDURE — 3288F PR FALLS RISK ASSESSMENT DOCUMENTED: ICD-10-PCS | Mod: CPTII,S$GLB,, | Performed by: OBSTETRICS & GYNECOLOGY

## 2023-01-25 PROCEDURE — 1100F PTFALLS ASSESS-DOCD GE2>/YR: CPT | Mod: CPTII,S$GLB,, | Performed by: OBSTETRICS & GYNECOLOGY

## 2023-01-25 PROCEDURE — 3008F PR BODY MASS INDEX (BMI) DOCUMENTED: ICD-10-PCS | Mod: CPTII,S$GLB,, | Performed by: OBSTETRICS & GYNECOLOGY

## 2023-01-25 PROCEDURE — 1126F AMNT PAIN NOTED NONE PRSNT: CPT | Mod: CPTII,S$GLB,, | Performed by: OBSTETRICS & GYNECOLOGY

## 2023-01-25 PROCEDURE — 1159F PR MEDICATION LIST DOCUMENTED IN MEDICAL RECORD: ICD-10-PCS | Mod: CPTII,S$GLB,, | Performed by: OBSTETRICS & GYNECOLOGY

## 2023-01-25 RX ORDER — NAFTIFINE HYDROCHLORIDE 20 MG/G
2 CREAM TOPICAL NIGHTLY PRN
Qty: 15 G | Refills: 1 | Status: SHIPPED | OUTPATIENT
Start: 2023-01-25

## 2023-01-25 RX ORDER — ROSUVASTATIN CALCIUM 10 MG/1
TABLET, COATED ORAL
COMMUNITY
Start: 2022-11-06

## 2023-01-25 NOTE — PROGRESS NOTES
LMP: No LMP recorded. Patient has had a hysterectomy..    Meds per MD: Naftin 2% & Zoloft    Last Pap: N/A  Last MM2022 birads 2 DIS  Last Colonoscopy: due   Last Bone Density: 2022 DIS

## 2023-01-25 NOTE — PROGRESS NOTES
"Chief Complaint Well woman exam:  Annual Exam    She is established.     Elsa Juárez is a 68 y.o. female  presents for a well woman exam.    S/p MONET and BSO hysterectomy  No c/o Fell in Nov - hit head - needed staples    ROS:  No abdominal pain No vaginal bleeding or discharge  No breast pain or masses, No rectal bleeding      LMP: No LMP recorded. Patient has had a hysterectomy..    Meds per MD: Naftin 2% & Zoloft     Last Pap: N/A  Last MM2022 birads 2 DIS  Last Colonoscopy: due   Last Bone Density: 2022 DIS     Past Medical History:   Diagnosis Date    Basal cell carcinoma     on forehead    Diverticulosis     Elevated liver enzymes     Herpes simplex virus (HSV) infection     History of colonoscopy     Normal    Lupus (systemic lupus erythematosus)     Osteopenia 2014       Past Surgical History:   Procedure Laterality Date    BREAST BIOPSY  2008    Left    CHOLECYSTECTOMY  2015    [facility]    HYSTERECTOMY  2002    MONET & BSO    TOE SURGERY  2018    right foot, second toe    tonsilectomy      TUBAL LIGATION         OB History    Para Term  AB Living   2 2 2     2   SAB IAB Ectopic Multiple Live Births           2      # Outcome Date GA Lbr Lj/2nd Weight Sex Delivery Anes PTL Lv   2 Term            1 Term                Family History   Problem Relation Age of Onset    Prostate cancer Father     Heart disease Father     Alzheimer's disease Mother     Prostate cancer Brother     Breast cancer Neg Hx     Colon cancer Neg Hx     Diabetes Neg Hx        Social History     Tobacco Use    Smoking status: Never    Smokeless tobacco: Never   Substance Use Topics    Alcohol use: No    Drug use: No         Physical Exam:  Ht 5' 7" (1.702 m)   Wt 109 kg (240 lb 4.8 oz)   BMI 37.64 kg/m²     APPEARANCE: Well nourished, well developed, in no acute distress.  AFFECT: WNL, alert and oriented x 3  SKIN: No acne or hirsutism  BREASTS: Symmetrical, no skin changes.                     "  No nipple discharge.   No palpable masses bilaterally  NODES: No inguinal nor axillary LAD  ABDOMEN: soft Non tender Non distended No masses  PELVIC:   Normal external genitalia without lesions.   Normal urethral meatus.   No signif cystocele or rectocele.  Vagina atrophic without lesions or discharge.  Cuff intact  Cervix absent  Adnexa without masses or tenderness.    EXTREMITIES: No edema.    ASSESSMENT AND PLAN  Elsa was seen today for annual exam.    Diagnoses and all orders for this visit:    Encounter for gynecological examination    Screening mammogram for breast cancer  -     Mammo Digital Screening Bilat w/ [facility]; Future  -     Mammo Digital Screening Bilat w/ [facility]    Other orders  -     naftifine 2 % Crea; Apply 2 cm topically nightly as needed (itching).          Patient was counseled today on A.C.S. Pap guidelines and recommendations for yearly pelvic exams, mammograms and monthly self breast exams; to see her PCP for other health maintenance.     Patient encouraged to register for portal and results will be sent via portal.    No follow-ups on file.         Health Maintenance   Topic Date Due    Hepatitis C Screening  Never done    TETANUS VACCINE  Never done    Mammogram  01/21/2023    DEXA Scan  08/05/2025    Lipid Panel  06/10/2027

## 2023-01-30 ENCOUNTER — PATIENT MESSAGE (OUTPATIENT)
Dept: OBSTETRICS AND GYNECOLOGY | Facility: CLINIC | Age: 68
End: 2023-01-30
Payer: MEDICARE

## 2023-01-31 RX ORDER — NYSTATIN 100000 [USP'U]/G
POWDER TOPICAL 4 TIMES DAILY
Qty: 30 G | Refills: 3 | Status: SHIPPED | OUTPATIENT
Start: 2023-01-31 | End: 2024-02-19

## 2023-02-10 ENCOUNTER — PATIENT MESSAGE (OUTPATIENT)
Dept: OBSTETRICS AND GYNECOLOGY | Facility: CLINIC | Age: 68
End: 2023-02-10
Payer: MEDICARE

## 2023-06-17 DIAGNOSIS — N32.81 OAB (OVERACTIVE BLADDER): ICD-10-CM

## 2023-06-17 RX ORDER — OXYBUTYNIN CHLORIDE 10 MG/1
TABLET, EXTENDED RELEASE ORAL
Qty: 90 TABLET | Refills: 3 | Status: SHIPPED | OUTPATIENT
Start: 2023-06-17

## 2024-02-19 ENCOUNTER — OFFICE VISIT (OUTPATIENT)
Dept: OBSTETRICS AND GYNECOLOGY | Facility: CLINIC | Age: 69
End: 2024-02-19
Attending: OBSTETRICS & GYNECOLOGY
Payer: MEDICARE

## 2024-02-19 VITALS
DIASTOLIC BLOOD PRESSURE: 84 MMHG | HEIGHT: 67 IN | SYSTOLIC BLOOD PRESSURE: 128 MMHG | BODY MASS INDEX: 34.6 KG/M2 | WEIGHT: 220.44 LBS

## 2024-02-19 DIAGNOSIS — Z01.419 ENCOUNTER FOR GYNECOLOGICAL EXAMINATION: Primary | ICD-10-CM

## 2024-02-19 DIAGNOSIS — Z12.31 BREAST CANCER SCREENING BY MAMMOGRAM: ICD-10-CM

## 2024-02-19 PROCEDURE — 1101F PT FALLS ASSESS-DOCD LE1/YR: CPT | Mod: CPTII,S$GLB,, | Performed by: OBSTETRICS & GYNECOLOGY

## 2024-02-19 PROCEDURE — 1126F AMNT PAIN NOTED NONE PRSNT: CPT | Mod: CPTII,S$GLB,, | Performed by: OBSTETRICS & GYNECOLOGY

## 2024-02-19 PROCEDURE — 99999 PR PBB SHADOW E&M-EST. PATIENT-LVL III: CPT | Mod: PBBFAC,,, | Performed by: OBSTETRICS & GYNECOLOGY

## 2024-02-19 PROCEDURE — 1159F MED LIST DOCD IN RCRD: CPT | Mod: CPTII,S$GLB,, | Performed by: OBSTETRICS & GYNECOLOGY

## 2024-02-19 PROCEDURE — 3288F FALL RISK ASSESSMENT DOCD: CPT | Mod: CPTII,S$GLB,, | Performed by: OBSTETRICS & GYNECOLOGY

## 2024-02-19 PROCEDURE — 3079F DIAST BP 80-89 MM HG: CPT | Mod: CPTII,S$GLB,, | Performed by: OBSTETRICS & GYNECOLOGY

## 2024-02-19 PROCEDURE — 3074F SYST BP LT 130 MM HG: CPT | Mod: CPTII,S$GLB,, | Performed by: OBSTETRICS & GYNECOLOGY

## 2024-02-19 PROCEDURE — G0101 CA SCREEN;PELVIC/BREAST EXAM: HCPCS | Mod: S$GLB,,, | Performed by: OBSTETRICS & GYNECOLOGY

## 2024-02-19 NOTE — PROGRESS NOTES
"Chief Complaint Well woman exam:  Annual Exam    She is established.     Elsa Juárez is a 69 y.o. female  presents for a well woman exam.    S/p MONET and BSO hysterectomy  Lost 20 # with dieting     ROS:  No abdominal pain No vaginal bleeding or discharge  No breast pain or masses, No rectal bleeding     LMP: No LMP recorded. Patient has had a hysterectomy..    Meds per MD: None     Last Pap: N/A  Last MMG: 3-6-2023 birads 2, DIS  Last Colonoscopy: repeat due now -prev [facility] now rec Catinis  Last Bone Density:  Osteopenia, DIS        Past Medical History:   Diagnosis Date    Basal cell carcinoma     on forehead    Diverticulosis     Elevated liver enzymes     Herpes simplex virus (HSV) infection     History of colonoscopy     Normal    Lupus (systemic lupus erythematosus)     Osteopenia        Past Surgical History:   Procedure Laterality Date    BREAST BIOPSY  2008    Left    CHOLECYSTECTOMY  2015    [facility]    HYSTERECTOMY  2002    MONET & BSO    SKIN CANCER EXCISION  2023    face Moh's procedure    TOE SURGERY      right foot, second toe    tonsilectomy      TUBAL LIGATION         OB History    Para Term  AB Living   2 2 2     2   SAB IAB Ectopic Multiple Live Births           2      # Outcome Date GA Lbr Lj/2nd Weight Sex Delivery Anes PTL Lv   2 Term            1 Term                Family History   Problem Relation Age of Onset    Prostate cancer Father     Heart disease Father     Alzheimer's disease Mother     Prostate cancer Brother     Breast cancer Neg Hx     Colon cancer Neg Hx     Diabetes Neg Hx        Social History     Tobacco Use    Smoking status: Never    Smokeless tobacco: Never   Substance Use Topics    Alcohol use: No    Drug use: No         Physical Exam:  /84 (Patient Position: Sitting)   Ht 5' 7" (1.702 m)   Wt 100 kg (220 lb 7.4 oz)   BMI 34.53 kg/m²     APPEARANCE: Well nourished, well developed, in no acute distress.  AFFECT: WNL, " alert and oriented x 3  SKIN: No acne or hirsutism  BREASTS: Symmetrical, no skin changes.                      No nipple discharge.   No palpable masses bilaterally  NODES: No inguinal nor axillary LAD  ABDOMEN: soft Non tender Non distended No masses  PELVIC:   Normal external genitalia without lesions.   Normal urethral meatus.   Vagina atrophic without lesions or discharge.  Cuff intact  Cervix absent  Adnexa without masses or tenderness.    EXTREMITIES: No edema.    ASSESSMENT AND PLAN  Elsa was seen today for annual exam.    Diagnoses and all orders for this visit:    Encounter for gynecological examination    Breast cancer screening by mammogram  -     Mammo Digital Screening Bilat w/ [facility]; Future  -     Mammo Digital Screening Bilat w/ [facility]          Patient was counseled today on A.C.S. Pap guidelines and recommendations for yearly pelvic exams, mammograms and monthly self breast exams; to see her PCP for other health maintenance.     Patient encouraged to register for portal and results will be sent via portal.    No follow-ups on file.         Health Maintenance   Topic Date Due    Colorectal Cancer Screening  Never done    Shingles Vaccine (2 of 2) 08/04/2023    Mammogram  03/06/2024    DEXA Scan  08/05/2025    Lipid Panel  06/09/2028    TETANUS VACCINE  11/05/2032    Hepatitis C Screening  Completed

## 2024-02-19 NOTE — PROGRESS NOTES
LMP: No LMP recorded. Patient has had a hysterectomy..    Meds per MD: None    Last Pap: N/A  Last MMG: 3-6-2023 birads 2, DIS  Last Colonoscopy: prev [facility]  Last Bone Density: 1-2022 Osteopenia, DIS

## 2024-04-04 ENCOUNTER — PATIENT MESSAGE (OUTPATIENT)
Dept: OBSTETRICS AND GYNECOLOGY | Facility: CLINIC | Age: 69
End: 2024-04-04
Payer: MEDICARE

## 2024-07-14 DIAGNOSIS — N32.81 OAB (OVERACTIVE BLADDER): ICD-10-CM

## 2024-07-14 RX ORDER — OXYBUTYNIN CHLORIDE 10 MG/1
10 TABLET, EXTENDED RELEASE ORAL
Qty: 90 TABLET | Refills: 2 | Status: SHIPPED | OUTPATIENT
Start: 2024-07-14

## 2024-07-14 NOTE — TELEPHONE ENCOUNTER
Refill Decision Note   Elsa Juárez  is requesting a refill authorization.  Brief Assessment and Rationale for Refill:  Approve     Medication Therapy Plan:       Medication Reconciliation Completed: No   Comments:     No Care Gaps recommended.     Note composed:1:16 PM 07/14/2024

## 2025-05-07 ENCOUNTER — OFFICE VISIT (OUTPATIENT)
Dept: OBSTETRICS AND GYNECOLOGY | Facility: CLINIC | Age: 70
End: 2025-05-07
Payer: MEDICARE

## 2025-05-07 VITALS
SYSTOLIC BLOOD PRESSURE: 126 MMHG | HEIGHT: 67 IN | BODY MASS INDEX: 31.83 KG/M2 | WEIGHT: 202.81 LBS | DIASTOLIC BLOOD PRESSURE: 84 MMHG

## 2025-05-07 DIAGNOSIS — Z01.419 ENCOUNTER FOR GYNECOLOGICAL EXAMINATION: Primary | ICD-10-CM

## 2025-05-07 DIAGNOSIS — Z12.31 BREAST CANCER SCREENING BY MAMMOGRAM: ICD-10-CM

## 2025-05-07 PROCEDURE — 1159F MED LIST DOCD IN RCRD: CPT | Mod: CPTII,S$GLB,, | Performed by: OBSTETRICS & GYNECOLOGY

## 2025-05-07 PROCEDURE — 3079F DIAST BP 80-89 MM HG: CPT | Mod: CPTII,S$GLB,, | Performed by: OBSTETRICS & GYNECOLOGY

## 2025-05-07 PROCEDURE — G0101 CA SCREEN;PELVIC/BREAST EXAM: HCPCS | Mod: S$GLB,,, | Performed by: OBSTETRICS & GYNECOLOGY

## 2025-05-07 PROCEDURE — 3074F SYST BP LT 130 MM HG: CPT | Mod: CPTII,S$GLB,, | Performed by: OBSTETRICS & GYNECOLOGY

## 2025-05-07 PROCEDURE — 1101F PT FALLS ASSESS-DOCD LE1/YR: CPT | Mod: CPTII,S$GLB,, | Performed by: OBSTETRICS & GYNECOLOGY

## 2025-05-07 PROCEDURE — 3288F FALL RISK ASSESSMENT DOCD: CPT | Mod: CPTII,S$GLB,, | Performed by: OBSTETRICS & GYNECOLOGY

## 2025-05-07 PROCEDURE — 1126F AMNT PAIN NOTED NONE PRSNT: CPT | Mod: CPTII,S$GLB,, | Performed by: OBSTETRICS & GYNECOLOGY

## 2025-05-07 PROCEDURE — 99999 PR PBB SHADOW E&M-EST. PATIENT-LVL III: CPT | Mod: PBBFAC,,, | Performed by: OBSTETRICS & GYNECOLOGY

## 2025-05-07 RX ORDER — HYDROCODONE BITARTRATE AND ACETAMINOPHEN 5; 325 MG/1; MG/1
TABLET ORAL
COMMUNITY
Start: 2025-05-06

## 2025-05-07 RX ORDER — GABAPENTIN 300 MG/1
300 CAPSULE ORAL 3 TIMES DAILY
COMMUNITY
Start: 2025-04-18

## 2025-05-07 RX ORDER — ALENDRONATE SODIUM 70 MG/1
70 TABLET ORAL WEEKLY
COMMUNITY
Start: 2025-04-21

## 2025-05-07 RX ORDER — IBUPROFEN 600 MG/1
TABLET ORAL
COMMUNITY
Start: 2025-03-18

## 2025-05-07 NOTE — PROGRESS NOTES
LMP: No LMP recorded. Patient has had a hysterectomy..    Meds per MD: Oxybutynin    Last Pap: MONET / BSO  Last MMG: 3- birads 2, DIS T-C 2%  Last Colonoscopy: 2014 normal  Last Bone Density: last week osteoporosis

## 2025-05-07 NOTE — PROGRESS NOTES
"Chief Complaint Well woman exam:  Annual Exam    She is established.     Elas Juárez is a 70 y.o. female  presents for a well woman exam.    S/p MONET and BSO   Fell in Dec and had surgery on right shoulder and hip   Sees ortho for f/u and Rheum for OP    ROS:  No abdominal pain No vaginal bleeding or discharge  No breast pain or masses, No rectal bleeding        LMP: No LMP recorded. Patient has had a hysterectomy..    Meds per MD: Oxybutynin     Last Pap: MONET / BSO  Last MMG: 3- birads 2, DIS T-C 2%  Last Colonoscopy:  normal  Last Bone Density: last week @ [facility] -osteoporosis- on Fosamax        Past Medical History:   Diagnosis Date    Basal cell carcinoma     on forehead    Diverticulosis     Elevated liver enzymes     Herpes simplex virus (HSV) infection     History of colonoscopy     Normal    Lupus (systemic lupus erythematosus)     Osteopenia        Past Surgical History:   Procedure Laterality Date    BREAST BIOPSY      Left    CHOLECYSTECTOMY  2015    [facility]    HYSTERECTOMY  2002    MONET & BSO    SKIN CANCER EXCISION  2023    face Moh's procedure    TOE SURGERY  2018    right foot, second toe    tonsilectomy      TUBAL LIGATION         OB History    Para Term  AB Living   2 2 2   2   SAB IAB Ectopic Multiple Live Births       2      # Outcome Date GA Lbr Lj/2nd Weight Sex Type Anes PTL Lv   2 Term            1 Term                Family History   Problem Relation Name Age of Onset    Prostate cancer Father      Heart disease Father      Alzheimer's disease Mother age 96     Prostate cancer Brother      Breast cancer Neg Hx      Colon cancer Neg Hx      Diabetes Neg Hx         Social History[1]        Physical Exam:  /84 (Patient Position: Sitting)   Ht 5' 7" (1.702 m)   Wt 92 kg (202 lb 13.2 oz)   BMI 31.77 kg/m²     APPEARANCE: Well nourished, well developed, in no acute distress.  AFFECT: WNL, alert and oriented x 3  SKIN: No acne or " hirsutism  BREASTS: Symmetrical, no skin changes.                      No nipple discharge.   No palpable masses bilaterally  NODES: No inguinal nor axillary LAD  ABDOMEN: soft Non tender Non distended No masses  PELVIC: Female chaperone was present in the room during pelvic exam.  Normal external genitalia without lesions.   Vagina atrophic without lesions or discharge.  Cuff intact  Cervix absent  Adnexa without masses or tenderness.    EXTREMITIES: No edema.    ASSESSMENT AND PLAN  Elsa was seen today for annual exam.    Diagnoses and all orders for this visit:    Encounter for gynecological examination      - PAP N/A hyst  - normal exam   - MMG UTD next due Now at [facility] - order given          - BP normotensive    Patient was counseled today on A.C.S. Pap guidelines and recommendations for yearly pelvic exams, mammograms and monthly self breast exams; to see her PCP for other health maintenance.     Patient encouraged to register for portal and results will be sent via portal.    No follow-ups on file.         Health Maintenance   Topic Date Due    Colorectal Cancer Screening  Never done    Shingles Vaccine (2 of 2) 08/04/2023    COVID-19 Vaccine (4 - 2024-25 season) 09/01/2024    Mammogram  03/25/2025    DEXA Scan  08/05/2025    RSV Vaccine (Age 60+ and Pregnant patients) (1 - 1-dose 75+ series) 01/21/2028    Lipid Panel  08/02/2029    TETANUS VACCINE  11/05/2032    Hepatitis C Screening  Completed    Influenza Vaccine  Completed    Pneumococcal Vaccines (Age 50+)  Completed                          [1]   Social History  Tobacco Use    Smoking status: Never    Smokeless tobacco: Never   Substance Use Topics    Alcohol use: No    Drug use: No

## 2025-05-30 ENCOUNTER — RESULTS FOLLOW-UP (OUTPATIENT)
Dept: OBSTETRICS AND GYNECOLOGY | Facility: CLINIC | Age: 70
End: 2025-05-30

## 2025-06-03 ENCOUNTER — TELEPHONE (OUTPATIENT)
Dept: PHARMACY | Facility: CLINIC | Age: 70
End: 2025-06-03
Payer: MEDICARE